# Patient Record
Sex: FEMALE | Race: WHITE | HISPANIC OR LATINO
[De-identification: names, ages, dates, MRNs, and addresses within clinical notes are randomized per-mention and may not be internally consistent; named-entity substitution may affect disease eponyms.]

---

## 2018-05-10 ENCOUNTER — TRANSCRIPTION ENCOUNTER (OUTPATIENT)
Age: 23
End: 2018-05-10

## 2022-11-19 ENCOUNTER — EMERGENCY (EMERGENCY)
Facility: HOSPITAL | Age: 27
LOS: 1 days | Discharge: HOME | End: 2022-11-19
Attending: EMERGENCY MEDICINE
Payer: COMMERCIAL

## 2022-11-19 VITALS
OXYGEN SATURATION: 98 % | HEART RATE: 73 BPM | TEMPERATURE: 98 F | RESPIRATION RATE: 20 BRPM | DIASTOLIC BLOOD PRESSURE: 68 MMHG | SYSTOLIC BLOOD PRESSURE: 106 MMHG

## 2022-11-19 VITALS
SYSTOLIC BLOOD PRESSURE: 113 MMHG | RESPIRATION RATE: 20 BRPM | OXYGEN SATURATION: 98 % | HEART RATE: 89 BPM | HEIGHT: 63 IN | WEIGHT: 190.04 LBS | TEMPERATURE: 98 F | DIASTOLIC BLOOD PRESSURE: 72 MMHG

## 2022-11-19 LAB — HCG UR QL: NEGATIVE — SIGNIFICANT CHANGE UP

## 2022-11-19 PROCEDURE — 76705 ECHO EXAM OF ABDOMEN: CPT | Mod: 26

## 2022-11-19 PROCEDURE — 71046 X-RAY EXAM CHEST 2 VIEWS: CPT

## 2022-11-19 PROCEDURE — 99284 EMERGENCY DEPT VISIT MOD MDM: CPT | Mod: 25

## 2022-11-19 PROCEDURE — 76705 ECHO EXAM OF ABDOMEN: CPT

## 2022-11-19 PROCEDURE — 81025 URINE PREGNANCY TEST: CPT

## 2022-11-19 PROCEDURE — 71046 X-RAY EXAM CHEST 2 VIEWS: CPT | Mod: 26

## 2022-11-19 PROCEDURE — 99283 EMERGENCY DEPT VISIT LOW MDM: CPT

## 2022-11-19 RX ORDER — LIDOCAINE 4 G/100G
1 CREAM TOPICAL ONCE
Refills: 0 | Status: COMPLETED | OUTPATIENT
Start: 2022-11-19 | End: 2022-11-19

## 2022-11-19 RX ORDER — ACETAMINOPHEN 500 MG
975 TABLET ORAL ONCE
Refills: 0 | Status: COMPLETED | OUTPATIENT
Start: 2022-11-19 | End: 2022-11-19

## 2022-11-19 RX ADMIN — LIDOCAINE 1 PATCH: 4 CREAM TOPICAL at 14:19

## 2022-11-19 RX ADMIN — Medication 975 MILLIGRAM(S): at 12:04

## 2022-11-19 RX ADMIN — LIDOCAINE 1 PATCH: 4 CREAM TOPICAL at 12:04

## 2022-11-19 NOTE — ED PROVIDER NOTE - CLINICAL SUMMARY MEDICAL DECISION MAKING FREE TEXT BOX
see MD Note ------------ATTENDING NOTE------------  pt c/o several days of constant mild R sided chest wall pain, tenderness to lower ribs, no trauma, no sob/dyspnea, pt had US by her PCP and told she had gallstones and to come to ED (no abd tenderness or RUQ/Hensley or pain w/ eating or n/v), no jaundice, otherwise nml exam, very low suspicion for acute biliary process but sent for additional US, awaiting imaging and close reassessments --> US w/ known cholelithiasis c/w exam for no signs acute cholecystitis, tolerating PO well w/o symptoms, nml VS, no additional complaints, multiple in depth dw pt (and PCP over phone) about ddx, tx, cohen, continued close outpt fu.  - Toño Feliz MD   -----------------------------------------------

## 2022-11-19 NOTE — ED PROVIDER NOTE - CARE PLAN
1 Principal Discharge DX:	Right-sided chest wall pain   Principal Discharge DX:	Right-sided chest wall pain  Secondary Diagnosis:	Cholelithiasis

## 2022-11-19 NOTE — ED ADULT NURSE NOTE - OBJECTIVE STATEMENT
Pt 26 y/o female, AxOx3, presents to ED ambulatory from PCP for RUQ pain. Pt seen in office this am for RUQ x 8 days. Pt reporting RUQ US this am showed gallstones. Pt is well appearing, speaking full sentences without difficulty. Breathing spontaneous and unlabored. Denies nausea, vomiting, urinary symptoms, pain worse w/ eating. MD at bedside for eval.

## 2022-11-19 NOTE — ED PROVIDER NOTE - PATIENT PORTAL LINK FT
You can access the FollowMyHealth Patient Portal offered by [facility] by registering at the following website: [email or web address]. By joining [employer]’s FollowMyHealth portal, you will also be able to view your health information using other applications (apps) compatible with our system.

## 2022-11-19 NOTE — ED PROVIDER NOTE - NSFOLLOWUPINSTRUCTIONS_ED_ALL_ED_FT
See your Primary Doctor and Surgery Clinic (rapid referral) next week for follow up -- call to discuss.    Stay well hydrated, eat regular healthy meals, avoid fried / fatty foods, get plenty of rest.    Use Acetaminophen and/or Ibuprofen as directed for pain -- see medication warnings.    See COSTOCHONDRITIS and BILIARY COLIC information and return instructions given to you.    Seek immediate medical care for new/worsening symptoms/concerns.

## 2022-11-19 NOTE — ED PROVIDER NOTE - ATTENDING CONTRIBUTION TO CARE
------------ATTENDING NOTE------------  pt c/o several days of constant mild R sided chest wall pain, tenderness to lower ribs, no trauma, no sob/dyspnea, pt had US by her PCP and told she had gallstones and to come to ED (no abd tenderness or RUQ/Hensley or pain w/ eating or n/v), very low suspicion for acute biliary process but sent for additional US, awaiting imaging and close reassessments -->   - Toño Feliz MD   -----------------------------------------------

## 2022-11-19 NOTE — ED PROVIDER NOTE - NS ED ROS FT
+ R chest wall pain  - fever, sob/dyspnea, cough, nausea/vomiting, anorexia, diarrhea, constipation, dysuria/frequency, rash, edema/calf tenderness

## 2022-11-19 NOTE — ED PROVIDER NOTE - NSFOLLOWUPCLINICS_GEN_ALL_ED_FT
[facility]  General Surgery  [address] [place] [address] - 3rd [place] [address]  Phone: [phone]  Fax:   Follow Up Time: Urgent

## 2022-11-19 NOTE — ED PROVIDER NOTE - PHYSICAL EXAMINATION
Well Appearing, Nontoxic, NAD;  Symm Facies, PERRL 3mm, (-)Pallor, Anicteric, MMM;  No JVD/Bruits or stridor;  RRR w/o m/g/r, equal distal pulses;   CTAB w/o distress, tender R chest wall/ribs 10/11 w/o crepitus;   Abd soft, nt/nd, +bs, no organomegaly, no RUQ tender/Hensley sign;  No CVAT;  No edema/calf tender;  No rash;  AOX3, Normal speech, normal strength/sensation/gait

## 2022-11-21 ENCOUNTER — APPOINTMENT (OUTPATIENT)
Dept: SURGERY | Facility: CLINIC | Age: 27
End: 2022-11-21
Payer: COMMERCIAL

## 2022-11-21 VITALS
HEART RATE: 74 BPM | HEIGHT: 63 IN | DIASTOLIC BLOOD PRESSURE: 74 MMHG | BODY MASS INDEX: 33.66 KG/M2 | WEIGHT: 190 LBS | SYSTOLIC BLOOD PRESSURE: 112 MMHG

## 2022-11-21 DIAGNOSIS — G43.909 MIGRAINE, UNSPECIFIED, NOT INTRACTABLE, W/OUT STATUS MIGRAINOSUS: ICD-10-CM

## 2022-11-21 DIAGNOSIS — Z78.9 OTHER SPECIFIED HEALTH STATUS: ICD-10-CM

## 2022-11-21 DIAGNOSIS — K81.9 CHOLECYSTITIS, UNSPECIFIED: ICD-10-CM

## 2022-11-21 DIAGNOSIS — Z80.9 FAMILY HISTORY OF MALIGNANT NEOPLASM, UNSPECIFIED: ICD-10-CM

## 2022-11-21 PROBLEM — Z00.00 ENCOUNTER FOR PREVENTIVE HEALTH EXAMINATION: Noted: 2022-11-21

## 2022-11-21 PROCEDURE — 99204 OFFICE O/P NEW MOD 45 MIN: CPT

## 2022-11-21 NOTE — PHYSICAL EXAM
[Abdominal Masses] : No abdominal masses [Abdomen Tenderness] : ~T ~M No abdominal tenderness [de-identified] : She  is alert, well-groomed, and in NAD\par   [de-identified] : anicteric.  Nasal mucosa pink, septum midline. Oral mucosa pink.  Tongue midline, Pharynx without exudates.\par   [de-identified] : Neck supple. Trachea midline. Thyroid isthmus barely palpable, lobes not felt.\par

## 2022-11-21 NOTE — PLAN
[FreeTextEntry1] : Ms. LAGUERRE  was told significance of findings, options, risks and benefits were explained.  Informed consent for laparoscopic/possible open  cholecystectomy  and potential risks, benefits and alternatives (surgical options were discussed including non-surgical options or the option of no surgery) to the planned surgery were discussed in depth.  All surgical options were discussed including non-surgical treatments.  She wishes to proceed with surgery.  We will plan for surgery on at the next available date, pending any required insurance pre-certification or pre-approval. She agrees to obtain any necessary pre-operative evaluations and testing prior to surgery. Patient instructed to maintain a fat-free diet, and to seek immediate medical attention with any acute change or worsening of symptoms, including but not limited to abdominal pain, fever, chills, nausea, vomiting, or yellowing of the skin. \par Patient advised to seek immediate medical attention with any acute change in symptoms or with the development of any new or worsening symptoms.  Patient's questions and concerns addressed to patient's satisfaction, and patient verbalized an understanding of the information discussed.

## 2022-11-24 PROBLEM — E66.9 OBESITY, UNSPECIFIED: Chronic | Status: ACTIVE | Noted: 2022-11-19

## 2022-11-24 PROBLEM — K80.20 CALCULUS OF GALLBLADDER WITHOUT CHOLECYSTITIS WITHOUT OBSTRUCTION: Chronic | Status: ACTIVE | Noted: 2022-11-19

## 2022-11-29 ENCOUNTER — OUTPATIENT (OUTPATIENT)
Dept: OUTPATIENT SERVICES | Facility: HOSPITAL | Age: 27
LOS: 1 days | End: 2022-11-29
Payer: COMMERCIAL

## 2022-11-29 VITALS
HEIGHT: 63 IN | OXYGEN SATURATION: 99 % | SYSTOLIC BLOOD PRESSURE: 100 MMHG | HEART RATE: 73 BPM | DIASTOLIC BLOOD PRESSURE: 67 MMHG | RESPIRATION RATE: 16 BRPM | TEMPERATURE: 98 F | WEIGHT: 190.04 LBS

## 2022-11-29 DIAGNOSIS — Z98.51 TUBAL LIGATION STATUS: Chronic | ICD-10-CM

## 2022-11-29 DIAGNOSIS — Z01.818 ENCOUNTER FOR OTHER PREPROCEDURAL EXAMINATION: ICD-10-CM

## 2022-11-29 DIAGNOSIS — K81.9 CHOLECYSTITIS, UNSPECIFIED: ICD-10-CM

## 2022-11-29 DIAGNOSIS — Z29.9 ENCOUNTER FOR PROPHYLACTIC MEASURES, UNSPECIFIED: ICD-10-CM

## 2022-11-29 LAB
ANION GAP SERPL CALC-SCNC: 6 MMOL/L — SIGNIFICANT CHANGE UP (ref 5–17)
BLD GP AB SCN SERPL QL: SIGNIFICANT CHANGE UP
BUN SERPL-MCNC: 9 MG/DL — SIGNIFICANT CHANGE UP (ref 7–18)
CALCIUM SERPL-MCNC: 9 MG/DL — SIGNIFICANT CHANGE UP (ref 8.4–10.5)
CHLORIDE SERPL-SCNC: 106 MMOL/L — SIGNIFICANT CHANGE UP (ref 96–108)
CO2 SERPL-SCNC: 26 MMOL/L — SIGNIFICANT CHANGE UP (ref 22–31)
CREAT SERPL-MCNC: 0.54 MG/DL — SIGNIFICANT CHANGE UP (ref 0.5–1.3)
EGFR: 129 ML/MIN/1.73M2 — SIGNIFICANT CHANGE UP
GLUCOSE SERPL-MCNC: 102 MG/DL — HIGH (ref 70–99)
HCT VFR BLD CALC: 39.4 % — SIGNIFICANT CHANGE UP (ref 34.5–45)
HGB BLD-MCNC: 13.2 G/DL — SIGNIFICANT CHANGE UP (ref 11.5–15.5)
MCHC RBC-ENTMCNC: 30.5 PG — SIGNIFICANT CHANGE UP (ref 27–34)
MCHC RBC-ENTMCNC: 33.5 GM/DL — SIGNIFICANT CHANGE UP (ref 32–36)
MCV RBC AUTO: 91 FL — SIGNIFICANT CHANGE UP (ref 80–100)
NRBC # BLD: 0 /100 WBCS — SIGNIFICANT CHANGE UP (ref 0–0)
PLATELET # BLD AUTO: 253 K/UL — SIGNIFICANT CHANGE UP (ref 150–400)
POTASSIUM SERPL-MCNC: 3.7 MMOL/L — SIGNIFICANT CHANGE UP (ref 3.5–5.3)
POTASSIUM SERPL-SCNC: 3.7 MMOL/L — SIGNIFICANT CHANGE UP (ref 3.5–5.3)
RBC # BLD: 4.33 M/UL — SIGNIFICANT CHANGE UP (ref 3.8–5.2)
RBC # FLD: 12.3 % — SIGNIFICANT CHANGE UP (ref 10.3–14.5)
SODIUM SERPL-SCNC: 138 MMOL/L — SIGNIFICANT CHANGE UP (ref 135–145)
WBC # BLD: 6.08 K/UL — SIGNIFICANT CHANGE UP (ref 3.8–10.5)
WBC # FLD AUTO: 6.08 K/UL — SIGNIFICANT CHANGE UP (ref 3.8–10.5)

## 2022-11-29 PROCEDURE — G0463: CPT

## 2022-11-29 NOTE — H&P PST ADULT - PROBLEM SELECTOR PLAN 1
Laparoscopic Cholecystectomy with Intra-Operative Cholangiogram   Possible Open      STOP BANG : 1  Low risk for AMY complications

## 2022-11-29 NOTE — H&P PST ADULT - NEGATIVE BREAST SYMPTOMS
.       History of Present Illness    chief complaint:    Chief Complaint   Patient presents with   • Chest Pain Adult         History of present illness:  17-year-old female has had a chronic cough for several months which is been attributed to postnasal drip.  Patient was driving this evening and developed acute onset of left upper chest pain which was sharp and severe and nonradiating and 8/10 in intensity and associated with feeling of racing heart with no aggravating or alleviating factors.  Symptoms lasted for several minutes but have now resolved at this time.  Patient was initially tachycardic on arrival to the emergency department.  She is not on birth control.  No calf pain or swelling.  No recent casting surgery or previous history of blood clots or long travel recently.  Denies pregnancy.     Review of Systems   Constitutional symptoms:  No fever, no chills.    ENMT symptoms:  no headache or neck pain.   Respiratory symptoms:  No shortness of breath, no hemoptysis.    Cardiovascular symptoms:  No syncope, as above gastrointestinal symptoms:  no melena, no abdominal pain, no vomiting, no rectal bleeding.    Genitourinary symptoms:  No dysuria, no hematuria.    Neurologic symptoms:  no confusion or focal motor or sensory deficits to the extremities.             Additional review of systems information: All other systems reviewed and otherwise negative.      Health Status   Allergies:  reviewed,   Medications:  reviewed        Past Medical/ Family/ Social History     History reviewed. No pertinent past medical history.     History reviewed. No pertinent surgical history.     Social history: Rarely drinks alcohol, denies tobacco or drug abuse.  Lives with her family    Family history: Noncontributory     Physical Examination   ED Triage Vitals [06/29/20 2134]   BP (!) 137/93   Heart Rate (!) 119   Resp 20   Temp 99.4 °F (37.4 °C)   SpO2 97 %      General: Alert, appears stated age. Vital signs reviewed.    ENT: No obvious facial or scalp trauma. External auditory canals and tympanic membranes intact.  Oropharynx is clear without lesions.  Airway is patent with a midline uvula.  Tonsils are pink and without exudate or masses.  Neck: the trachea is midline.  No masses or bruits.  Nontender to  palpation.  No lymphadenopathy.  Respiratory/chest: no respiratory distress, breath sounds clear in all lobes, no rales, wheezing or retraction, tenderness left upper chest wall   cardiovascular: Regular rate and rhythm, Normal S1 and S2, No murmur.  Abdomen: Soft, nontender, without distension. No rebound or guarding.  No masses.  No bruits or abnormal pulsations.  Bowel sounds are normoactive.  No hernia.  Back: No traumatic markings. Nontender to palpation.  Neurologic: Patient is alert and conversant with clear and coherent speech. Face is symmetric. Sensation is intact to all extremeties. Moves all extremeties spontaneously with normal muscle tone.  Skin: no diffuse rashes.  Normal to palpation.  Extremities: Warm and dry.  No peripheral edema.  No clubbing or cyanosis.  Nontender to palpation.  No calf tenderness swelling or palpable cords or calf asymmetry.     Medical Decision Making   Pulse oximetry reading was 98% on room air.  The patient had normal oxygenation.      The pt was masked on arrival  Myself and the nurses wore masks, eye protection and gloves for this encounter although there is no reason to suspect covid-19 in this patient, however as we can not test we will take every precaution to prevent asymptomatic transmission to our vulnerable patient population.     Cardiac Monitor Cardiac Monitor Strip Rhythm is normal sinus, Rate is normal,   12 LEAD EKG INTERPRETATION: Rhythm is normal sinus, Rate is normal, Conduction is normal, ST Segments/T waves: T wave inversions in V3 and V4, flattening of the T waves inferiorly, EKG, compared to previous EKG from February 2019 T waves are more inverted in V3 and are now  inverted in V4 which may be related to lead placement or progression from previous EKG, normal axis, Impression: Normal sinus rhythm with T wave inversions in V3 and V4.  Cardiac monitor and EKG were reviewed by me.       Radiology Interpretation: I Reviewed the Radiographic study and Radiologist's Interpretation,       XR CHEST PA AND LATERAL 2 VIEWS   Final Result   No lung opacity concerning for consolidation.      Electronically Signed by: AILYN BARRETT M.D.    Signed on: 6/29/2020 10:53 PM               Labs:  Admission on 06/29/2020   Component Date Value Ref Range Status   • Sodium 06/29/2020 140  135 - 145 mmol/L Final   • Potassium 06/29/2020 3.8  3.4 - 5.1 mmol/L Final    Slight hemolysis, result may be falsely increased.   • Chloride 06/29/2020 106  98 - 107 mmol/L Final   • Carbon Dioxide 06/29/2020 27  21 - 32 mmol/L Final   • Anion Gap 06/29/2020 11  10 - 20 mmol/L Final   • Glucose 06/29/2020 101* 65 - 99 mg/dL Final   • BUN 06/29/2020 11  6 - 20 mg/dL Final   • Creatinine 06/29/2020 0.70  0.51 - 0.95 mg/dL Final   • GFR Estimate,  06/29/2020 >90   Final    eGFR results = or >90 mL/min/1.73m2 = Normal kidney function.   • GFR Estimate, Non  06/29/2020 >90   Final    eGFR results = or >90 mL/min/1.73m2 = Normal kidney function.   • BUN/Creatinine Ratio 06/29/2020 16  7 - 25 Final   • CALCIUM 06/29/2020 8.9  8.4 - 10.2 mg/dL Final   • TOTAL BILIRUBIN 06/29/2020 0.2  0.2 - 1.0 mg/dL Final   • AST/SGOT 06/29/2020 24  <38 Units/L Final    Slight hemolysis, result may be falsely increased.   • ALT/SGPT 06/29/2020 33  6 - 35 Units/L Final   • ALK PHOSPHATASE 06/29/2020 117* 42 - 110 Units/L Final   • TOTAL PROTEIN 06/29/2020 8.1  6.4 - 8.2 g/dL Final   • Albumin 06/29/2020 4.1  3.6 - 5.1 g/dL Final   • GLOBULIN 06/29/2020 4.0  2.0 - 4.0 g/dL Final   • A/G Ratio, Serum 06/29/2020 1.0  1.0 - 2.4 Final   • WBC 06/29/2020 13.9* 4.2 - 11.0 K/mcL Final   • RBC 06/29/2020 4.39   4.00 - 5.20 mil/mcL Final   • HGB 06/29/2020 13.4  12.0 - 15.5 g/dL Final   • HCT 06/29/2020 40.0  36.0 - 46.5 % Final   • MCV 06/29/2020 91.1  78.0 - 100.0 fl Final   • MCH 06/29/2020 30.5  26.0 - 34.0 pg Final   • MCHC 06/29/2020 33.5  32.0 - 36.5 g/dL Final   • RDW-CV 06/29/2020 11.9  11.0 - 15.0 % Final   • PLT 06/29/2020 351  140 - 450 K/mcL Final   • NRBC 06/29/2020 0  0 /100 WBC Final   • DIFF TYPE 06/29/2020 AUTOMATED DIFFERENTIAL   Final   • Neutrophil 06/29/2020 69  % Final   • LYMPH 06/29/2020 21  % Final   • MONO 06/29/2020 8  % Final   • EOSIN 06/29/2020 1  % Final   • BASO 06/29/2020 1  % Final   • Percent Immature Granuloctyes 06/29/2020 0  % Final   • Absolute Neutrophil 06/29/2020 9.7* 1.8 - 8.0 K/mcL Final   • Absolute Lymph 06/29/2020 3.0  1.2 - 5.2 K/mcL Final   • Absolute Mono 06/29/2020 1.0* 0.3 - 0.9 K/mcL Final   • Absolute Eos 06/29/2020 0.1  0.1 - 0.5 K/mcL Final   • Absolute Baso 06/29/2020 0.1  0.0 - 0.3 K/mcL Final   • Absolute Immature Granulocytes 06/29/2020 0.1  0 - 0.2 K/mcl Final   • TROPONIN I 06/29/2020 <0.02  <0.05 ng/mL Final   • D Dimer Quantitative 06/29/2020 0.22  <0.57 mg/L (FEU) Final    Values <0.50 mg/L (FEU) may be useful to help exclude DVT or PE in patients at low clinical risk for these disorders.   • HCG QUALITATIVE 06/29/2020 NEGATIVE  NEGATIVE Final         Reexamination/ Reevaluation   patient currently resting comfortably with no distress at midnight.  Patient will be discharged home at this time.  Patient was advised follow-up closely with her doctor and return to ER for any new or worsening symptoms     Impression and Plan     Condition: Stable                                                                    Disposition: Home    Final Diagnosis:   1. Chest pain, unspecified type            Summary of your Discharge Medications      You have not been prescribed any medications.         Instructions:    No. 1 discharge home.  No. 2 may try 3 Motrin for the  chest pain every 6 hours.  May also try Mylanta to see if this helps with the pain.    No. 3 close observation by family at home.  No. 4 follow-up with your regular doctor in 2 to 3 days for re-examination.  No. 5 Return immediately for high fever, worsening chest pain, passing out, palpitations, shortness of breath, vomiting or any new or worsening conditions.     Mervin Church MD  06/29/20 2994     no breast tenderness L/no breast tenderness R

## 2022-11-29 NOTE — H&P PST ADULT - ASSESSMENT
28 yo female is scheduled for :  Laparoscopic Cholecystectomy with Intra-Operative Cholangiogram   Possible Open, on 12/2/22

## 2022-11-29 NOTE — H&P PST ADULT - HISTORY OF PRESENT ILLNESS
26 yo female with no significant PMHx reports the above.  Patient states had abdominal pain went to the ER to find out that she has gallstones, now she is going to remove her gallbladder. She is scheduled for : Laparoscopic Cholecystectomy with Intra-Operative Cholangiogram   Possible Open, on 12/2/22

## 2022-11-29 NOTE — H&P PST ADULT - PROBLEM SELECTOR PLAN 2
Instruction regarding chlorhexidine soap use is given. Patient verbalized understanding.  Literature also given

## 2022-12-01 ENCOUNTER — TRANSCRIPTION ENCOUNTER (OUTPATIENT)
Age: 27
End: 2022-12-01

## 2022-12-02 ENCOUNTER — RESULT REVIEW (OUTPATIENT)
Age: 27
End: 2022-12-02

## 2022-12-02 ENCOUNTER — APPOINTMENT (OUTPATIENT)
Dept: SURGERY | Facility: HOSPITAL | Age: 27
End: 2022-12-02

## 2022-12-02 ENCOUNTER — TRANSCRIPTION ENCOUNTER (OUTPATIENT)
Age: 27
End: 2022-12-02

## 2022-12-02 ENCOUNTER — OUTPATIENT (OUTPATIENT)
Dept: OUTPATIENT SERVICES | Facility: HOSPITAL | Age: 27
LOS: 1 days | End: 2022-12-02
Payer: COMMERCIAL

## 2022-12-02 VITALS
SYSTOLIC BLOOD PRESSURE: 104 MMHG | OXYGEN SATURATION: 97 % | DIASTOLIC BLOOD PRESSURE: 68 MMHG | HEART RATE: 91 BPM | RESPIRATION RATE: 17 BRPM

## 2022-12-02 VITALS
HEART RATE: 90 BPM | TEMPERATURE: 98 F | DIASTOLIC BLOOD PRESSURE: 64 MMHG | OXYGEN SATURATION: 98 % | WEIGHT: 190.04 LBS | RESPIRATION RATE: 17 BRPM | HEIGHT: 63 IN | SYSTOLIC BLOOD PRESSURE: 96 MMHG

## 2022-12-02 DIAGNOSIS — K81.9 CHOLECYSTITIS, UNSPECIFIED: ICD-10-CM

## 2022-12-02 DIAGNOSIS — Z98.51 TUBAL LIGATION STATUS: Chronic | ICD-10-CM

## 2022-12-02 DIAGNOSIS — Z01.818 ENCOUNTER FOR OTHER PREPROCEDURAL EXAMINATION: ICD-10-CM

## 2022-12-02 LAB
BLD GP AB SCN SERPL QL: SIGNIFICANT CHANGE UP
HCG UR QL: NEGATIVE — SIGNIFICANT CHANGE UP

## 2022-12-02 PROCEDURE — 47563 LAPARO CHOLECYSTECTOMY/GRAPH: CPT

## 2022-12-02 PROCEDURE — 86901 BLOOD TYPING SEROLOGIC RH(D): CPT

## 2022-12-02 PROCEDURE — C9399: CPT

## 2022-12-02 PROCEDURE — 86900 BLOOD TYPING SEROLOGIC ABO: CPT

## 2022-12-02 PROCEDURE — 88304 TISSUE EXAM BY PATHOLOGIST: CPT

## 2022-12-02 PROCEDURE — 81025 URINE PREGNANCY TEST: CPT

## 2022-12-02 PROCEDURE — 88304 TISSUE EXAM BY PATHOLOGIST: CPT | Mod: 26

## 2022-12-02 PROCEDURE — 86850 RBC ANTIBODY SCREEN: CPT

## 2022-12-02 PROCEDURE — 74300 X-RAY BILE DUCTS/PANCREAS: CPT

## 2022-12-02 PROCEDURE — 36415 COLL VENOUS BLD VENIPUNCTURE: CPT

## 2022-12-02 DEVICE — IMPLANTABLE DEVICE: Type: IMPLANTABLE DEVICE | Site: LAPAROSCOPIC CHOLECYSTECTOMY WITH INTRA OPERATIVE CHOLANGIOGRAM | Status: FUNCTIONAL

## 2022-12-02 DEVICE — CLIP APPLIER COVIDIEN ENDOCLIP II 10MM MED/LG: Type: IMPLANTABLE DEVICE | Site: LAPAROSCOPIC CHOLECYSTECTOMY WITH INTRA OPERATIVE CHOLANGIOGRAM | Status: FUNCTIONAL

## 2022-12-02 RX ORDER — OXYCODONE HYDROCHLORIDE 5 MG/1
1 TABLET ORAL
Qty: 8 | Refills: 0
Start: 2022-12-02 | End: 2022-12-03

## 2022-12-02 RX ORDER — ONDANSETRON 8 MG/1
4 TABLET, FILM COATED ORAL ONCE
Refills: 0 | Status: DISCONTINUED | OUTPATIENT
Start: 2022-12-02 | End: 2022-12-02

## 2022-12-02 RX ORDER — SODIUM CHLORIDE 9 MG/ML
3 INJECTION INTRAMUSCULAR; INTRAVENOUS; SUBCUTANEOUS EVERY 8 HOURS
Refills: 0 | Status: DISCONTINUED | OUTPATIENT
Start: 2022-12-02 | End: 2022-12-02

## 2022-12-02 RX ORDER — HYDROMORPHONE HYDROCHLORIDE 2 MG/ML
1 INJECTION INTRAMUSCULAR; INTRAVENOUS; SUBCUTANEOUS
Refills: 0 | Status: DISCONTINUED | OUTPATIENT
Start: 2022-12-02 | End: 2022-12-02

## 2022-12-02 RX ORDER — HYDROMORPHONE HYDROCHLORIDE 2 MG/ML
0.5 INJECTION INTRAMUSCULAR; INTRAVENOUS; SUBCUTANEOUS
Refills: 0 | Status: DISCONTINUED | OUTPATIENT
Start: 2022-12-02 | End: 2022-12-02

## 2022-12-02 RX ADMIN — HYDROMORPHONE HYDROCHLORIDE 0.5 MILLIGRAM(S): 2 INJECTION INTRAMUSCULAR; INTRAVENOUS; SUBCUTANEOUS at 11:20

## 2022-12-02 RX ADMIN — HYDROMORPHONE HYDROCHLORIDE 0.5 MILLIGRAM(S): 2 INJECTION INTRAMUSCULAR; INTRAVENOUS; SUBCUTANEOUS at 11:52

## 2022-12-02 NOTE — ASU DISCHARGE PLAN (ADULT/PEDIATRIC) - NS MD DC FALL RISK RISK
For information on Fall & Injury Prevention, visit: [email or web address] OR  [email or web address] OR  https://www.cdc.gov/steadi/patient.html

## 2022-12-02 NOTE — ASU PATIENT PROFILE, ADULT - FALL HARM RISK - UNIVERSAL INTERVENTIONS
Bed in lowest position, wheels locked, appropriate side rails in place/Call bell, personal items and telephone in reach/Instruct patient to call for assistance before getting out of bed or chair/Non-slip footwear when patient is out of bed/[place] to call system/Physically safe environment - no spills, clutter or unnecessary equipment/Purposeful Proactive Rounding/Room/bathroom lighting operational, light cord in reach

## 2022-12-02 NOTE — ASU DISCHARGE PLAN (ADULT/PEDIATRIC) - CARE PROVIDER_API CALL
Bruce Camara)  Surgery  [address] [facility], [place] [address]  Phone: [phone]  Fax: [phone]  Established Patient  Follow Up Time: 2 weeks

## 2022-12-02 NOTE — ASU DISCHARGE PLAN (ADULT/PEDIATRIC) - ASU DC SPECIAL INSTRUCTIONSFT
Take 500mg every 6 hours and motrin 400mg every 6 hours as needed for pain. remove gauze and tape in 48 hours and leave steri strips in placed do not remove. ok to shower in 72 hours and pat dry incisions. no heavy lifting or strenuous exercise for 4 weeks

## 2022-12-02 NOTE — BRIEF OPERATIVE NOTE - OPERATION/FINDINGS
s/p Lap Cholecystectomy with IOC (no filling defects in CBD, CHD, duodenum patent). cystic duct stone removed prior to IOC.

## 2022-12-08 LAB — SURGICAL PATHOLOGY STUDY: SIGNIFICANT CHANGE UP

## 2022-12-15 ENCOUNTER — APPOINTMENT (OUTPATIENT)
Dept: SURGERY | Facility: CLINIC | Age: 27
End: 2022-12-15

## 2022-12-15 DIAGNOSIS — K80.20 CALCULUS OF GALLBLADDER W/OUT CHOLECYSTITIS W/OUT OBSTRUCTION: ICD-10-CM

## 2022-12-15 PROCEDURE — 99024 POSTOP FOLLOW-UP VISIT: CPT

## 2022-12-15 NOTE — PLAN
[FreeTextEntry1] : Ms. LAGUERRE will follow up  if needed. Warning signs, follow up, and restrictions were discussed with the patient.

## 2022-12-15 NOTE — HISTORY OF PRESENT ILLNESS
[de-identified] : Ms. LAGUERRE  is s/p laparoscopic  cholecystectomy  on 12/02/2022.  Today Ms. LAGUERRE offers no complaints. patient reports no fever, chills,  or  pain. Her  surgical wounds are  healing well. No signs of inflammation, infection or exudate. Patient reports good bowel movements and appetite.

## 2022-12-15 NOTE — ASSESSMENT
[FreeTextEntry1] : Ms. LAGUERRE is doing well, with excellent post-operative recovery. All surgical incisions are healing well and as expected. There is no evidence of infection or complication, and she is progressing as expected. Post-operative wound care, activity, restrictions and precautions reinforced. Patient instructed to refrain from any heavy lifting greater than 10-15 pounds for at least 4 weeks post-operatively. pathology results were discussed in details.  Patient's questions and concerns addressed to patient's satisfaction.

## 2022-12-15 NOTE — DATA REVIEWED
[FreeTextEntry1] : Yudith Accession Number : [id number]\par Patient:   JHONY LAGUERRE\par \par \par Accession:                             [id number]\par \par Collected Date/Time:                   12/2/2022 10:45 EST\par Received Date/Time:                    12/2/2022 15:00 EST\par \par Surgical Pathology Report - Auth (Verified)\par \par Specimen(s) Submitted\par 1  Gallbladder\par \par Final Diagnosis\par Gallbladder; laparoscopic cholecystectomy:\par - Chronic cholecystitis, cholesterolosis and cholelithiasis\par \par Verified by: Tarsha Hall M.D.\par (Electronic Signature)\par Reported on: 12/08/22 14:54 EST, [address]\par Phone: [phone]   Fax: [phone]\par _________________________________________________________________\par \par Clinical Information\par Cholecystitis, cholelithiasis\par

## 2022-12-15 NOTE — PHYSICAL EXAM
[Abdominal Masses] : No abdominal masses [Abdomen Tenderness] : ~T ~M No abdominal tenderness [Alert] : alert [Oriented to Person] : oriented to person [Oriented to Place] : oriented to place [Oriented to Time] : oriented to time [Calm] : calm [de-identified] : She  is alert, well-groomed, and in NAD\par   [de-identified] : anicteric.  Nasal mucosa pink, septum midline. Oral mucosa pink.  Tongue midline, Pharynx without exudates.\par   [de-identified] : Neck supple. Trachea midline. Thyroid isthmus barely palpable, lobes not felt.\par   [de-identified] : Surgical wounds are  healing well.   no signs of  inflammation or infection.

## 2023-01-05 NOTE — H&P PST ADULT - ACCEPTABLE
----- Message from Tim Mota sent at 1/4/2023  2:18 PM CST -----  Contact: Pt   Type: Needs Medical Advice    Who Called:Pt   Best Call Back Number:[phone]    Additional Information Requesting a call back regarding Pt  was calling to speak with office on behalf of pt  states pt is having a medical emergency and wanted to speak with office to see if the pt needs to go to ER  stated they would rather speak with Dr to get the okay first please call Thank you  Please Advise-Thank you        
Patient seen in ER  
2

## 2023-07-26 ENCOUNTER — NON-APPOINTMENT (OUTPATIENT)
Age: 28
End: 2023-07-26

## 2023-08-13 ENCOUNTER — EMERGENCY (EMERGENCY)
Facility: HOSPITAL | Age: 28
LOS: 1 days | Discharge: HOME | End: 2023-08-13
Attending: STUDENT IN AN ORGANIZED HEALTH CARE EDUCATION/TRAINING PROGRAM
Payer: COMMERCIAL

## 2023-08-13 VITALS
SYSTOLIC BLOOD PRESSURE: 112 MMHG | TEMPERATURE: 98 F | HEART RATE: 79 BPM | OXYGEN SATURATION: 98 % | DIASTOLIC BLOOD PRESSURE: 68 MMHG | RESPIRATION RATE: 18 BRPM | WEIGHT: 193.79 LBS | HEIGHT: 63 IN

## 2023-08-13 DIAGNOSIS — Z98.51 TUBAL LIGATION STATUS: Chronic | ICD-10-CM

## 2023-08-13 PROCEDURE — 73120 X-RAY EXAM OF HAND: CPT

## 2023-08-13 PROCEDURE — 99284 EMERGENCY DEPT VISIT MOD MDM: CPT

## 2023-08-13 PROCEDURE — 99283 EMERGENCY DEPT VISIT LOW MDM: CPT | Mod: 25

## 2023-08-13 PROCEDURE — 73120 X-RAY EXAM OF HAND: CPT | Mod: 26,50

## 2023-08-13 PROCEDURE — 96372 THER/PROPH/DIAG INJ SC/IM: CPT

## 2023-08-13 RX ORDER — ACETAMINOPHEN 500 MG
975 TABLET ORAL ONCE
Refills: 0 | Status: COMPLETED | OUTPATIENT
Start: 2023-08-13 | End: 2023-08-13

## 2023-08-13 RX ORDER — CYCLOBENZAPRINE HYDROCHLORIDE 10 MG/1
1 TABLET, FILM COATED ORAL
Qty: 9 | Refills: 0
Start: 2023-08-13 | End: 2023-08-15

## 2023-08-13 RX ORDER — KETOROLAC TROMETHAMINE 30 MG/ML
30 SYRINGE (ML) INJECTION ONCE
Refills: 0 | Status: DISCONTINUED | OUTPATIENT
Start: 2023-08-13 | End: 2023-08-13

## 2023-08-13 RX ADMIN — Medication 975 MILLIGRAM(S): at 13:55

## 2023-08-13 RX ADMIN — Medication 30 MILLIGRAM(S): at 13:15

## 2023-08-13 RX ADMIN — Medication 30 MILLIGRAM(S): at 13:55

## 2023-08-13 RX ADMIN — Medication 975 MILLIGRAM(S): at 13:14
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
## 2023-08-13 NOTE — ED PROVIDER NOTE - PATIENT PORTAL LINK FT
You can access the FollowMyHealth Patient Portal offered by [facility] by registering at the following website: [email or web address]. By joining [employer]’s FollowMyHealth portal, you will also be able to view your health information using other applications (apps) compatible with our system.
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
## 2023-08-13 NOTE — ED ADULT NURSE NOTE - NSFALLUNIVINTERV_ED_ALL_ED
Bed/Stretcher in lowest position, wheels locked, appropriate side rails in place/Call bell, personal items and telephone in reach/Instruct patient to call for assistance before getting out of bed/chair/stretcher/Non-slip footwear applied when patient is off stretcher/[place] to call system/Physically safe environment - no spills, clutter or unnecessary equipment/Purposeful proactive rounding/Room/bathroom lighting operational, light cord in reach
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
## 2023-08-16 LAB — SARS-COV-2 N GENE NPH QL NAA+PROBE: NOT DETECTED

## 2024-12-02 ENCOUNTER — EMERGENCY (EMERGENCY)
Facility: HOSPITAL | Age: 29
LOS: 1 days | Discharge: HOME | End: 2024-12-02
Attending: STUDENT IN AN ORGANIZED HEALTH CARE EDUCATION/TRAINING PROGRAM
Payer: SELF-PAY

## 2024-12-02 VITALS — SYSTOLIC BLOOD PRESSURE: 117 MMHG | DIASTOLIC BLOOD PRESSURE: 69 MMHG

## 2024-12-02 VITALS
OXYGEN SATURATION: 99 % | RESPIRATION RATE: 16 BRPM | HEIGHT: 63 IN | TEMPERATURE: 98 F | WEIGHT: 196.21 LBS | HEART RATE: 85 BPM

## 2024-12-02 DIAGNOSIS — Z98.51 TUBAL LIGATION STATUS: Chronic | ICD-10-CM

## 2024-12-02 PROCEDURE — 99283 EMERGENCY DEPT VISIT LOW MDM: CPT

## 2024-12-02 PROCEDURE — 99284 EMERGENCY DEPT VISIT MOD MDM: CPT

## 2024-12-02 RX ORDER — LIDOCAINE 40 MG/G
1 CREAM TOPICAL ONCE
Refills: 0 | Status: COMPLETED | OUTPATIENT
Start: 2024-12-02 | End: 2024-12-02

## 2024-12-02 RX ORDER — CYCLOBENZAPRINE HCL 10 MG
1 TABLET ORAL
Qty: 12 | Refills: 0
Start: 2024-12-02 | End: 2024-12-05

## 2024-12-02 RX ORDER — CYCLOBENZAPRINE HCL 10 MG
10 TABLET ORAL ONCE
Refills: 0 | Status: COMPLETED | OUTPATIENT
Start: 2024-12-02 | End: 2024-12-02

## 2024-12-02 RX ORDER — IBUPROFEN 200 MG
600 TABLET ORAL ONCE
Refills: 0 | Status: COMPLETED | OUTPATIENT
Start: 2024-12-02 | End: 2024-12-02

## 2024-12-02 RX ADMIN — Medication 600 MILLIGRAM(S): at 22:19

## 2024-12-02 RX ADMIN — Medication 10 MILLIGRAM(S): at 22:20

## 2024-12-02 RX ADMIN — LIDOCAINE 1 PATCH: 40 CREAM TOPICAL at 22:20
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
## 2024-12-02 NOTE — ED PROVIDER NOTE - PHYSICAL EXAMINATION
GENERAL: no acute distress; well-developed  HEAD:  Atraumatic, Normocephalic  EYES: EOMI, PERRLA, conjunctiva and sclera clear  ENT: MMM; oropharynx clear  NECK: Supple, No JVD. R paraspinal ttp.   CHEST/LUNG: Clear to auscultation bilaterally; No wheeze  HEART: Regular rate and rhythm; No murmurs, rubs, or gallops  ABDOMEN: Soft, Nontender, Nondistended; Bowel sounds present  EXTREMITIES:  2+ Peripheral Pulses, No clubbing, cyanosis, or edema  PSYCH: AAOx3  NEUROLOGY: no focal motor or sensory deficits. 5/5 muscle strength in all extremities.   SKIN: No rashes or lesions
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
## 2024-12-02 NOTE — ED PROVIDER NOTE - NSFOLLOWUPINSTRUCTIONS_ED_ALL_ED_FT
Take Tylenol 650 mg every 6-8 hours or Motrin 400-600 mg every 6-8 hours as need for pain  Flexiril up to three times per day. No driving after the medication as it may cause drowsiness.   Lidocaine patch  Follow up with your Primary Care Doctor.   Seek Medical attention or return to the emergency department for any new or worsening symptoms.

## 2024-12-02 NOTE — ED PROVIDER NOTE - PATIENT PORTAL LINK FT
You can access the FollowMyHealth Patient Portal offered by [facility] by registering at the following website: [email or web address]. By joining [employer]’s FollowMyHealth portal, you will also be able to view your health information using other applications (apps) compatible with our system.

## 2025-03-24 ENCOUNTER — NON-APPOINTMENT (OUTPATIENT)
Age: 30
End: 2025-03-24

## 2025-04-19 NOTE — HISTORY OF PRESENT ILLNESS
[de-identified] : Ms. JHONY LAGUERRE is a 27 year  old patient who was referred by Dr. Rosalva Weir with the chief complaint of having right upper quadrant and epigastric pain for 1 week. Pain is non-radiating .   She reports no nausea or vomiting and no history of jaundice, acholia or choluria.   Appetite is good and weight is stable.   She   has no family history of biliary tract disease.   she reports going to ED on 11/19/2022 with severe abdominal pain where she had an abdominal US that showed multiple GB stones. CBD was normal. she had elevated LFTs.   This was a shared visit with the [facility]. I reviewed and verified the documentation.

## (undated) DEVICE — TUBING TRUWAVE PRESSURE MALE/FEMALE 72"

## (undated) DEVICE — DRSG BANDAID 0.75X3"

## (undated) DEVICE — ELCTR BOVIE BLADE 3/4" EXTENDED LENGTH 6"

## (undated) DEVICE — GLV 7.5 PROTEXIS (WHITE)

## (undated) DEVICE — ELCTR FOOT CONTROL L WIRE LAPAROSCOPIC

## (undated) DEVICE — WARMING BLANKET UPPER ADULT

## (undated) DEVICE — SUCTION YANKAUER NO CONTROL VENT

## (undated) DEVICE — ELCTR GROUNDING PAD ADULT COVIDIEN

## (undated) DEVICE — SYR LUER LOK 10CC

## (undated) DEVICE — FOR-ESU VALLEYLAB T7E14982DX: Type: DURABLE MEDICAL EQUIPMENT

## (undated) DEVICE — DRAPE C ARM UNIVERSAL

## (undated) DEVICE — TUBING STRYKER PNEUMOSURE HEATED RTP

## (undated) DEVICE — SUT POLYSORB 0 30" GU-46

## (undated) DEVICE — DRSG STERISTRIPS 0.5 X 4"

## (undated) DEVICE — PACK GENERAL LAPAROSCOPY

## (undated) DEVICE — PRECISION CUT SCISSOR MICROLINE MINI ENDOCUT DISP

## (undated) DEVICE — DRAIN JACKSON PRATT 10MM FLAT 3/4 NO TROCAR

## (undated) DEVICE — VENODYNE/SCD SLEEVE CALF MEDIUM

## (undated) DEVICE — SOL INJ NS 0.9% 1000ML

## (undated) DEVICE — BLADE SURGICAL #15 CARBON

## (undated) DEVICE — ENDOCATCH 10MM SPECIMEN POUCH

## (undated) DEVICE — DRSG MASTISOL

## (undated) DEVICE — BASIN SET SINGLE

## (undated) DEVICE — STAPLER SKIN VISI-STAT 35 WIDE

## (undated) DEVICE — DRAPE LIGHT HANDLE COVER (BLUE)

## (undated) DEVICE — D HELP - CLEARVIEW CLEARIFY SYSTEM

## (undated) DEVICE — DRSG MEDIPORE DRESS IT 5-7/8 X 11"

## (undated) DEVICE — BLADE SURGICAL #10 CARBON

## (undated) DEVICE — SOL IRR POUR NS 0.9% 1500ML

## (undated) DEVICE — DRSG CURITY GAUZE SPONGE 4 X 4" 12-PLY

## (undated) DEVICE — INSUFFLATION NDL COVIDIEN SURGINEEDLE VERESS 120MM

## (undated) DEVICE — TUBING STRYKEFLOW II SUCTION / IRRIGATOR

## (undated) DEVICE — DRAIN RESERVOIR FOR JACKSON PRATT 100CC CARDINAL

## (undated) DEVICE — NDL HYPO REGULAR BEVEL 25G X 1.5" (BLUE)

## (undated) DEVICE — SUT MAXON 1 60" T-60

## (undated) DEVICE — TROCAR COVIDIEN VERSAONE BLADED SMOOTH 11MM STANDARD

## (undated) DEVICE — GLV 7 PROTEXIS (WHITE)

## (undated) DEVICE — SUT POLYSORB 4-0 27" P-12 UNDYED

## (undated) DEVICE — SYR ASEPTO

## (undated) DEVICE — TROCAR COVIDIEN VERSAONE BLADED SMOOTH 5MM STANDARD